# Patient Record
Sex: MALE | Race: WHITE | NOT HISPANIC OR LATINO | Employment: FULL TIME | URBAN - METROPOLITAN AREA
[De-identification: names, ages, dates, MRNs, and addresses within clinical notes are randomized per-mention and may not be internally consistent; named-entity substitution may affect disease eponyms.]

---

## 2017-02-28 ENCOUNTER — OFFICE VISIT (OUTPATIENT)
Dept: FAMILY MEDICINE CLINIC | Facility: CLINIC | Age: 22
End: 2017-02-28

## 2017-02-28 VITALS
RESPIRATION RATE: 18 BRPM | HEART RATE: 70 BPM | HEIGHT: 69 IN | WEIGHT: 151 LBS | DIASTOLIC BLOOD PRESSURE: 80 MMHG | TEMPERATURE: 97.8 F | BODY MASS INDEX: 22.36 KG/M2 | SYSTOLIC BLOOD PRESSURE: 118 MMHG

## 2017-02-28 DIAGNOSIS — F41.9 ANXIETY: Primary | ICD-10-CM

## 2017-02-28 PROCEDURE — 99213 OFFICE O/P EST LOW 20 MIN: CPT | Performed by: FAMILY MEDICINE

## 2017-02-28 NOTE — PROGRESS NOTES
Subjective   Frank Gutierrez is a 21 y.o. male.     History of Present Illness     He notes that his mood is not as good as it used to be  He is starting to avoid people again after being ok in crowds  More anxious feeling again as well    He has tried and failed wellbutrin, prozac, lamictal in the past.  zoloft was best medicine for this      Review of Systems   Constitutional: Negative.    Psychiatric/Behavioral: The patient is nervous/anxious.        Objective   Physical Exam   Constitutional: He appears well-developed and well-nourished. No distress.   Cardiovascular: Normal rate, regular rhythm and normal heart sounds.    Pulmonary/Chest: Effort normal and breath sounds normal.   Psychiatric: He has a normal mood and affect. His behavior is normal.   Nursing note and vitals reviewed.      Assessment/Plan   Frank was seen today for follow-up.    Diagnoses and all orders for this visit:    Anxiety  -     Vortioxetine HBr (TRINTELLIX) 20 MG tablet; Take 20 mg by mouth Daily.    will try trintellix 20 mg, pt to call with update in 1 month, he agrees (he will call sooner with any problems)

## 2017-04-03 ENCOUNTER — TELEPHONE (OUTPATIENT)
Dept: FAMILY MEDICINE CLINIC | Facility: CLINIC | Age: 22
End: 2017-04-03

## 2017-04-03 NOTE — TELEPHONE ENCOUNTER
----- Message from María Burris sent at 4/3/2017 12:38 PM EDT -----  Contact: IRVIN;PT CALLED  PT STATES HE HAS NOT GOTTEN THE TRINTELLIX RX BECAUSE  [facility] STATE IT WAS NOT SENT [facility] BACK IN [facility]  BUT ZOLOFT WAS THERE SO HE HAS BEEN TAKING IT INSTEAD BUT  HE IS RUNNING OUT OF IT  [facility]    [phone]  MESSAGE OK

## 2017-04-14 DIAGNOSIS — R45.4 IRRITABILITY: ICD-10-CM

## 2017-04-14 DIAGNOSIS — F32.A DEPRESSION: ICD-10-CM

## 2017-04-14 RX ORDER — SERTRALINE HYDROCHLORIDE 100 MG/1
TABLET, FILM COATED ORAL
Qty: 180 TABLET | Refills: 0 | Status: SHIPPED | OUTPATIENT
Start: 2017-04-14 | End: 2018-03-07

## 2017-08-09 ENCOUNTER — OFFICE VISIT (OUTPATIENT)
Dept: FAMILY MEDICINE CLINIC | Facility: CLINIC | Age: 22
End: 2017-08-09

## 2017-08-09 VITALS
DIASTOLIC BLOOD PRESSURE: 82 MMHG | RESPIRATION RATE: 18 BRPM | WEIGHT: 156 LBS | BODY MASS INDEX: 23.11 KG/M2 | HEIGHT: 69 IN | TEMPERATURE: 97.2 F | SYSTOLIC BLOOD PRESSURE: 118 MMHG | HEART RATE: 68 BPM

## 2017-08-09 DIAGNOSIS — F34.1 DYSTHYMIA: Primary | ICD-10-CM

## 2017-08-09 PROCEDURE — 99213 OFFICE O/P EST LOW 20 MIN: CPT | Performed by: FAMILY MEDICINE

## 2017-08-09 RX ORDER — VILAZODONE HYDROCHLORIDE 20 MG/1
TABLET ORAL
Qty: 60 TABLET | Refills: 1 | Status: SHIPPED | OUTPATIENT
Start: 2017-08-09 | End: 2017-08-09 | Stop reason: SDUPTHER

## 2017-08-09 RX ORDER — VILAZODONE HYDROCHLORIDE 20 MG/1
TABLET ORAL
Qty: 30 TABLET | Refills: 1 | Status: SHIPPED | OUTPATIENT
Start: 2017-08-09 | End: 2018-03-07

## 2017-08-09 NOTE — PROGRESS NOTES
Subjective   Frank Gutierrez is a 22 y.o. male.     History of Present Illness     He has had nausea with the trintellix and not helping his mood as much either  In the past failed wellbutrin, prozac, lamictal and zoloft    Mood just not that good  He has a history of alcohol and drug abuse but has been clean for 4 years      Review of Systems   Psychiatric/Behavioral: Positive for dysphoric mood.       Objective   Physical Exam   Constitutional: He appears well-developed and well-nourished. No distress.   Cardiovascular: Normal rate, regular rhythm and normal heart sounds.    Pulmonary/Chest: Effort normal and breath sounds normal.   Psychiatric: He has a normal mood and affect. His behavior is normal.   Nursing note and vitals reviewed.      Assessment/Plan   Frank was seen today for follow-up.    Diagnoses and all orders for this visit:    Dysthymia  -     Discontinue: vilazodone (VIIBRYD) 20 MG tablet tablet; 1/2 po daily 6 days then 1 po daily 6 days then 2 po daily  -     vilazodone (VIIBRYD) 20 MG tablet tablet; 1/2 po daily 6 days then 1 po daily    he has failed wellbutrin, prozac, lamictal and trintellix.  Will try viibryd and insurance will only pay for one a day so titrate to 20 mg.  Pt to call in one month with update.  Consider return to zoloft and then add abilify in the future pending results of viibryd.

## 2018-03-06 ENCOUNTER — TELEPHONE (OUTPATIENT)
Dept: FAMILY MEDICINE CLINIC | Facility: CLINIC | Age: 23
End: 2018-03-06

## 2018-03-06 NOTE — TELEPHONE ENCOUNTER
----- Message from María Winters sent at 3/6/2018  4:35 PM EST -----  Contact: IRVIN  PATIENT HASN'T BEEN HERE IN A WHILE, BUT HE DOESN'T WANT A REFILL OF THE MEDICATION THAT HE IS CURRENTLY ON BECAUSE IT IS NOT WORKING. HE WORKS 6 DAYS A WEEK AND IS IN THE [employer] SO IT IS HARD TO GET IN HERE FOR AN APPT.     HE IS ASKING IF HIS MEDICATION CAN BE CHANGED TO ANOTHER MEDICATION.    PHARMACY: [facility]    [phone]

## 2018-03-07 RX ORDER — SERTRALINE HYDROCHLORIDE 100 MG/1
100 TABLET, FILM COATED ORAL DAILY
Qty: 30 TABLET | Refills: 0 | Status: SHIPPED | OUTPATIENT
Start: 2018-03-07 | End: 2018-05-07

## 2018-03-07 NOTE — TELEPHONE ENCOUNTER
I sent in zoloft to resume but he will need an appointment for more than 30 day supply.  He simply needs to be seen to continue medications at this office.

## 2018-05-07 ENCOUNTER — OFFICE VISIT (OUTPATIENT)
Dept: FAMILY MEDICINE CLINIC | Facility: CLINIC | Age: 23
End: 2018-05-07

## 2018-05-07 VITALS
BODY MASS INDEX: 25.48 KG/M2 | HEART RATE: 78 BPM | HEIGHT: 69 IN | WEIGHT: 172 LBS | TEMPERATURE: 97.4 F | RESPIRATION RATE: 18 BRPM | DIASTOLIC BLOOD PRESSURE: 72 MMHG | SYSTOLIC BLOOD PRESSURE: 114 MMHG

## 2018-05-07 DIAGNOSIS — R76.8 ABNORMAL HEPATITIS SEROLOGY: Primary | ICD-10-CM

## 2018-05-07 PROCEDURE — 99213 OFFICE O/P EST LOW 20 MIN: CPT | Performed by: FAMILY MEDICINE

## 2018-05-07 RX ORDER — BUSPIRONE HYDROCHLORIDE 15 MG/1
TABLET ORAL
Refills: 0 | COMMUNITY
Start: 2018-04-18 | End: 2018-08-13 | Stop reason: SDUPTHER

## 2018-05-07 RX ORDER — VENLAFAXINE HYDROCHLORIDE 150 MG/1
150 CAPSULE, EXTENDED RELEASE ORAL DAILY
Refills: 0 | COMMUNITY
Start: 2018-04-18 | End: 2018-08-13 | Stop reason: SDUPTHER

## 2018-05-08 LAB
ALBUMIN SERPL-MCNC: 4.5 G/DL (ref 3.2–4.8)
ALP SERPL-CCNC: 67 U/L (ref 25–100)
ALT SERPL-CCNC: 32 U/L (ref 7–40)
AST SERPL-CCNC: 25 U/L (ref 0–33)
BILIRUB DIRECT SERPL-MCNC: 0.1 MG/DL (ref 0–0.2)
BILIRUB SERPL-MCNC: 0.4 MG/DL (ref 0.3–1.2)
HAV IGM SERPL QL IA: NEGATIVE
HBV CORE IGM SERPL QL IA: NEGATIVE
HBV SURFACE AG SERPL QL IA: NEGATIVE
HCV AB S/CO SERPL IA: <0.1 S/CO RATIO (ref 0–0.9)
PROT SERPL-MCNC: 6.6 G/DL (ref 5.7–8.2)

## 2018-08-12 NOTE — PROGRESS NOTES
Subjective   Frank Gutierrez is a 23 y.o. male.   Chief Complaint   Patient presents with   • Depression     followup   • Poison Ivy     Depression   Visit Type: follow-up  Patient is not experiencing: confusion, depressed mood, excessive worry, nervousness/anxiety, palpitations, shortness of breath and suicidal ideas.  Frequency of symptoms: occasionally   Severity: mild   Sleep quality: good  Compliance with medications:  %        Anxiety   Presents for follow-up visit. Patient reports no chest pain, confusion, depressed mood, excessive worry, nervous/anxious behavior, palpitations, shortness of breath or suicidal ideas. Symptoms occur occasionally. The quality of sleep is good.     His past medical history is significant for depression. Compliance with medications is %.   Depression and anxiety are currently treated with Effexor, Buspar, and Vistaril.  Current treatment is doing well. He is traveling to [place] soon, wanting to make sure that he has enough medication and refills.     Rash: New onset, exposed to poison ivy. Rash present on upper extremities and neck.  Has treated with topical OTC poison ivy wash, some relief.   Rash has been present x 3 days.          The following portions of the patient's history were reviewed and updated as appropriate: allergies, current medications, past family history, past medical history, past social history, past surgical history and problem list.    Review of Systems   Constitutional: Negative for chills, fatigue and fever.   HENT: Negative.    Eyes: Negative.    Respiratory: Negative for cough, chest tightness and shortness of breath.    Cardiovascular: Negative for chest pain and palpitations.   Gastrointestinal: Negative.    Skin: Positive for rash.   Neurological: Negative for light-headedness, headache and confusion.   Hematological: Negative for adenopathy. Does not bruise/bleed easily.   Psychiatric/Behavioral: Negative for dysphoric mood,  self-injury, suicidal ideas and depressed mood. The patient is not nervous/anxious.        Objective   Physical Exam   Constitutional: He is oriented to person, place, and time. He appears well-developed and well-nourished. No distress.   HENT:   Head: Normocephalic.   Right Ear: External ear normal.   Left Ear: External ear normal.   Nose: Nose normal.   Eyes: Conjunctivae are normal.   Neck: Normal range of motion.   Cardiovascular: Normal rate, regular rhythm and normal heart sounds.    No murmur heard.  Pulmonary/Chest: Effort normal and breath sounds normal. He has no wheezes.   Musculoskeletal: He exhibits no edema or deformity.   Neurological: He is alert and oriented to person, place, and time.   Skin: Skin is warm and dry. Rash noted. Rash is macular (present on neck, upper extremities, mild erythema). Rash is not papular and not vesicular.   Psychiatric: He has a normal mood and affect. His speech is normal and behavior is normal. Thought content normal.   Nursing note and vitals reviewed.        Assessment/Plan   Frank was seen today for depression and poison ivy.    Diagnoses and all orders for this visit:    Dysthymia  -     venlafaxine XR (EFFEXOR-XR) 150 MG 24 hr capsule; Take 1 capsule by mouth Daily.    Anxiety  -     hydrOXYzine (VISTARIL) 100 MG capsule; Take 1 capsule by mouth Daily.  -     venlafaxine XR (EFFEXOR-XR) 150 MG 24 hr capsule; Take 1 capsule by mouth Daily.  -     busPIRone (BUSPAR) 15 MG tablet; Take 1 tablet by mouth 2 (Two) Times a Day.    Poison ivy dermatitis  -     predniSONE (DELTASONE) 10 MG tablet; Take 60 mg po day 1, 50 mg day 2, 40 mg day 3, 30 mg day 4, 20 mg day 5, 10 mg day 6      Anxiety and depression are stable with current treatment, no changes.   Prednisone to improve poison ivy dermatitis.

## 2018-08-13 ENCOUNTER — OFFICE VISIT (OUTPATIENT)
Dept: FAMILY MEDICINE CLINIC | Facility: CLINIC | Age: 23
End: 2018-08-13

## 2018-08-13 VITALS
SYSTOLIC BLOOD PRESSURE: 112 MMHG | HEART RATE: 72 BPM | TEMPERATURE: 97.9 F | HEIGHT: 69 IN | RESPIRATION RATE: 18 BRPM | DIASTOLIC BLOOD PRESSURE: 76 MMHG | WEIGHT: 163.5 LBS | BODY MASS INDEX: 24.22 KG/M2

## 2018-08-13 DIAGNOSIS — L23.7 POISON IVY DERMATITIS: ICD-10-CM

## 2018-08-13 DIAGNOSIS — F34.1 DYSTHYMIA: Primary | ICD-10-CM

## 2018-08-13 DIAGNOSIS — F41.9 ANXIETY: ICD-10-CM

## 2018-08-13 PROCEDURE — 99214 OFFICE O/P EST MOD 30 MIN: CPT | Performed by: FAMILY MEDICINE

## 2018-08-13 RX ORDER — VENLAFAXINE HYDROCHLORIDE 150 MG/1
150 CAPSULE, EXTENDED RELEASE ORAL DAILY
Qty: 90 CAPSULE | Refills: 1 | Status: SHIPPED | OUTPATIENT
Start: 2018-08-13 | End: 2019-10-29 | Stop reason: SDUPTHER

## 2018-08-13 RX ORDER — PREDNISONE 10 MG/1
TABLET ORAL
Qty: 21 TABLET | Refills: 0 | Status: SHIPPED | OUTPATIENT
Start: 2018-08-13 | End: 2019-03-27

## 2018-08-13 RX ORDER — HYDROXYZINE PAMOATE 100 MG/1
CAPSULE ORAL
COMMUNITY
Start: 2018-06-18 | End: 2018-08-13 | Stop reason: SDUPTHER

## 2018-08-13 RX ORDER — BUSPIRONE HYDROCHLORIDE 15 MG/1
15 TABLET ORAL 2 TIMES DAILY
Qty: 180 TABLET | Refills: 1 | Status: SHIPPED | OUTPATIENT
Start: 2018-08-13 | End: 2019-10-29 | Stop reason: SDUPTHER

## 2018-08-13 RX ORDER — HYDROXYZINE PAMOATE 100 MG/1
100 CAPSULE ORAL DAILY
Qty: 90 CAPSULE | Refills: 1 | Status: SHIPPED | OUTPATIENT
Start: 2018-08-13 | End: 2019-03-27

## 2018-08-13 NOTE — PATIENT INSTRUCTIONS
Go to the nearest ER or return to clinic if symptoms worsen, fever/chill develop      Poison Ivy Dermatitis  Poison ivy dermatitis is redness and soreness (inflammation) of the skin. It is caused by a chemical that is found on the leaves of the poison ivy plant. You may also have itching, a rash, and blisters. Symptoms often clear up in 1-2 weeks.  You may get this condition by touching a poison ivy plant. You can also get it by touching something that has the chemical on it. This may include animals or objects that have come in contact with the plant.  Follow these instructions at home:  General instructions  · Take or apply over-the-counter and prescription medicines only as told by your doctor.  · If you touch poison ivy, wash your skin with soap and cold water right away.  · Use hydrocortisone creams or calamine lotion as needed to help with itching.  · Take oatmeal baths as needed. Use colloidal oatmeal. You can get this at a pharmacy or grocery store. Follow the instructions on the package.  · Do not scratch or rub your skin.  · While you have the rash, wash your clothes right after you wear them.  Prevention  · Know what poison ivy looks like so you can avoid it. This plant has three leaves with flowering branches on a single stem. The leaves are glossy. They have uneven edges that come to a point at the front.  · If you have touched poison ivy, wash with soap and water right away. Be sure to wash under your fingernails.  · When hiking or camping, wear long pants, a long-sleeved shirt, tall socks, and hiking boots. You can also use a lotion on your skin that helps to prevent contact with the chemical on the plant.  · If you think that your clothes or outdoor gear came in contact with poison ivy, rinse them off with a garden hose before you bring them inside your house.  Contact a doctor if:  · You have open sores in the rash area.  · You have more redness, swelling, or pain in the affected area.  · You have  redness that spreads beyond the rash area.  · You have fluid, blood, or pus coming from the affected area.  · You have a fever.  · You have a rash over a large area of your body.  · You have a rash on your eyes, mouth, or genitals.  · Your rash does not get better after a few days.  Get help right away if:  · Your face swells or your eyes swell shut.  · You have trouble breathing.  · You have trouble swallowing.  This information is not intended to replace advice given to you by your health care provider. Make sure you discuss any questions you have with your health care provider.  Document Released: 01/20/2012 Document Revised: 05/25/2017 Document Reviewed: 05/25/2016  [employer] Interactive Patient Education © 2018 [employer]

## 2019-03-27 ENCOUNTER — OFFICE VISIT (OUTPATIENT)
Dept: FAMILY MEDICINE CLINIC | Facility: CLINIC | Age: 24
End: 2019-03-27

## 2019-03-27 VITALS
WEIGHT: 170 LBS | RESPIRATION RATE: 16 BRPM | BODY MASS INDEX: 25.18 KG/M2 | HEART RATE: 74 BPM | DIASTOLIC BLOOD PRESSURE: 80 MMHG | TEMPERATURE: 98.4 F | SYSTOLIC BLOOD PRESSURE: 124 MMHG | HEIGHT: 69 IN

## 2019-03-27 DIAGNOSIS — F10.10 ALCOHOL ABUSE: Primary | ICD-10-CM

## 2019-03-27 PROCEDURE — 99213 OFFICE O/P EST LOW 20 MIN: CPT | Performed by: FAMILY MEDICINE

## 2019-03-27 NOTE — PROGRESS NOTES
Sadie Gutierrez is a 23 y.o. male.     History of Present Illness     He has a history of alcohol abuse and was in treratment from age [age]  Was sober for 4 years and then had relapse around age [age] for about a year  Then was in rehab again from march-April 2018 for 30 days  Sober for ab out 5 months and then started to drink again until about 2 weeks  He has been sober for 2 weeks and using AA at this time  He has been working the 12 steps    His father is using [employer] and they are working through this issue to determine future plans  He has a sponsor and is going to AA three times a week    Court date is 4/18/19    Pt does live alone in [place]  Working at [employer] for the last week and had been with [employer] prior to that    The following portions of the patient's history were reviewed and updated as appropriate: allergies, current medications, past family history, past medical history, past social history, past surgical history and problem list.    Review of Systems   Constitutional: Negative.    Cardiovascular: Negative.    Gastrointestinal: Negative.    Skin: Negative.    Psychiatric/Behavioral: Negative.  Negative for dysphoric mood. The patient is not nervous/anxious.        Objective   Physical Exam   Constitutional: He appears well-developed and well-nourished. No distress.   Cardiovascular: Normal rate, regular rhythm and normal heart sounds.   Pulmonary/Chest: Effort normal and breath sounds normal.   Psychiatric: He has a normal mood and affect. His behavior is normal.   Nursing note and vitals reviewed.      Assessment/Plan   Diagnoses and all orders for this visit:    Alcohol abuse    he is involved in AA which is an excellent program.  I did give him information about other outpatients programs that he could be involved with in order to keep his job and pay bills.  These included [employer], [employer] and [employer].  He may look into this but will continue with  [employer].  Pt is motivated to stop his drinking as he understands it is a problem and he needs help to stop.

## 2019-10-29 ENCOUNTER — OFFICE VISIT (OUTPATIENT)
Dept: FAMILY MEDICINE CLINIC | Facility: CLINIC | Age: 24
End: 2019-10-29

## 2019-10-29 VITALS
DIASTOLIC BLOOD PRESSURE: 82 MMHG | BODY MASS INDEX: 26.36 KG/M2 | RESPIRATION RATE: 18 BRPM | HEIGHT: 69 IN | TEMPERATURE: 98.2 F | SYSTOLIC BLOOD PRESSURE: 122 MMHG | HEART RATE: 78 BPM | WEIGHT: 178 LBS

## 2019-10-29 DIAGNOSIS — F10.10 ALCOHOL ABUSE: ICD-10-CM

## 2019-10-29 DIAGNOSIS — F41.9 ANXIETY: ICD-10-CM

## 2019-10-29 DIAGNOSIS — F34.1 DYSTHYMIA: Primary | ICD-10-CM

## 2019-10-29 DIAGNOSIS — B00.9 HSV-2 (HERPES SIMPLEX VIRUS 2) INFECTION: ICD-10-CM

## 2019-10-29 DIAGNOSIS — R45.4 IRRITABILITY: ICD-10-CM

## 2019-10-29 PROCEDURE — 99214 OFFICE O/P EST MOD 30 MIN: CPT | Performed by: FAMILY MEDICINE

## 2019-10-29 RX ORDER — VENLAFAXINE HYDROCHLORIDE 150 MG/1
150 CAPSULE, EXTENDED RELEASE ORAL DAILY
Qty: 90 CAPSULE | Refills: 1 | Status: SHIPPED | OUTPATIENT
Start: 2019-10-29 | End: 2020-05-28

## 2019-10-29 RX ORDER — DISULFIRAM 250 MG/1
250 TABLET ORAL DAILY
Qty: 90 TABLET | Refills: 1 | Status: SHIPPED | OUTPATIENT
Start: 2019-10-29 | End: 2020-06-01

## 2019-10-29 RX ORDER — VENLAFAXINE HYDROCHLORIDE 75 MG/1
75 CAPSULE, EXTENDED RELEASE ORAL DAILY
Qty: 7 CAPSULE | Refills: 0 | Status: SHIPPED | OUTPATIENT
Start: 2019-10-29 | End: 2020-06-01

## 2019-10-29 RX ORDER — BUSPIRONE HYDROCHLORIDE 15 MG/1
15 TABLET ORAL 2 TIMES DAILY
Qty: 180 TABLET | Refills: 1 | Status: SHIPPED | OUTPATIENT
Start: 2019-10-29 | End: 2020-06-01 | Stop reason: SDUPTHER

## 2019-10-29 NOTE — PROGRESS NOTES
Sadie Gutierrez is a 24 y.o. male.     History of Present Illness     He has a long history of alcohol abuse  Was sober for ab out 4 years and then started drinking again  He was on antabuse and this helps, AA helped in the past as well  He would like to get back on medicine for his mood and get help with the alcohol    Mood has been doing well on effexor 150 QD and buspar 15 BID in the past  He was off it 2-3 months but then started it 2 days ago  His mood was so much better while on the medicine    He has had a couple skin outbreaks in his groin and was seen at [facility] on 2 different occasions for this.  Was told this was a MRSA and HSV-1 outbreak one time in the past  Then he had another outbreak and was told this was HSV-2  These 2 outbreaks were over the last month.  He has not had this prior to the last month  Just wants to know what this is and if there is anything to prevent this    The following portions of the patient's history were reviewed and updated as appropriate: allergies, current medications, past family history, past medical history, past social history, past surgical history and problem list.    Review of Systems   Constitutional: Negative.    HENT: Negative.    Eyes: Negative.    Respiratory: Negative.  Negative for shortness of breath.    Cardiovascular: Negative.  Negative for chest pain.   Gastrointestinal: Negative.  Negative for constipation and diarrhea.   Musculoskeletal: Negative.    Skin: Positive for rash (groin).   Neurological: Negative.    Psychiatric/Behavioral: Positive for dysphoric mood. The patient is nervous/anxious.    All other systems reviewed and are negative.      Objective   Physical Exam   Constitutional: He is oriented to person, place, and time. He appears well-developed and well-nourished. No distress.   Cardiovascular: Normal rate, regular rhythm and normal heart sounds.   Pulmonary/Chest: Effort normal and breath sounds normal.   Abdominal: Soft. Bowel  sounds are normal. He exhibits no distension. There is no tenderness.   Neurological: He is alert and oriented to person, place, and time.   Psychiatric: He has a normal mood and affect. His behavior is normal. Judgment and thought content normal.   Nursing note and vitals reviewed.      Assessment/Plan   Frank was seen today for med refill.    Diagnoses and all orders for this visit:    Dysthymia  -     venlafaxine XR (EFFEXOR XR) 75 MG 24 hr capsule; Take 1 capsule by mouth Daily.  -     venlafaxine XR (EFFEXOR-XR) 150 MG 24 hr capsule; Take 1 capsule by mouth Daily.    Irritability  -     venlafaxine XR (EFFEXOR XR) 75 MG 24 hr capsule; Take 1 capsule by mouth Daily.    Anxiety  -     venlafaxine XR (EFFEXOR-XR) 150 MG 24 hr capsule; Take 1 capsule by mouth Daily.  -     busPIRone (BUSPAR) 15 MG tablet; Take 1 tablet by mouth 2 (Two) Times a Day.    Alcohol abuse  -     disulfiram (ANTABUSE) 250 MG tablet; Take 1 tablet by mouth Daily.    HSV-2 (herpes simplex virus 2) infection  -     HSV 1 & 2 - Specific Antibody, IgG    mood was doing well on effexor and buspar, will resume medications.  Ok to resume antabuse for alcohol, AA meetings recommended and pt will start going back to the program  Will check labs for HSV, consider daily valtrex regardless of the results to prevent reoccurence.  Pt wants to wait for labs.

## 2019-10-30 DIAGNOSIS — B00.9 HSV-2 (HERPES SIMPLEX VIRUS 2) INFECTION: Primary | ICD-10-CM

## 2019-10-30 LAB
HSV1 IGG SER IA-ACNC: <0.91 INDEX (ref 0–0.9)
HSV2 IGG SER IA-ACNC: 15.4 INDEX (ref 0–0.9)

## 2019-10-30 RX ORDER — VALACYCLOVIR HYDROCHLORIDE 500 MG/1
500 TABLET, FILM COATED ORAL DAILY
Qty: 30 TABLET | Refills: 11 | Status: SHIPPED | OUTPATIENT
Start: 2019-10-30

## 2019-12-17 ENCOUNTER — OFFICE VISIT (OUTPATIENT)
Dept: FAMILY MEDICINE CLINIC | Facility: CLINIC | Age: 24
End: 2019-12-17

## 2019-12-17 VITALS
RESPIRATION RATE: 16 BRPM | HEART RATE: 76 BPM | HEIGHT: 69 IN | WEIGHT: 175 LBS | SYSTOLIC BLOOD PRESSURE: 124 MMHG | TEMPERATURE: 98.1 F | DIASTOLIC BLOOD PRESSURE: 84 MMHG | BODY MASS INDEX: 25.92 KG/M2

## 2019-12-17 DIAGNOSIS — F34.1 DYSTHYMIA: Primary | ICD-10-CM

## 2019-12-17 DIAGNOSIS — R45.4 IRRITABILITY: ICD-10-CM

## 2019-12-17 PROCEDURE — 99213 OFFICE O/P EST LOW 20 MIN: CPT | Performed by: FAMILY MEDICINE

## 2019-12-17 NOTE — PROGRESS NOTES
Sadie Gutierrez is a 24 y.o. male.     History of Present Illness     His mood is doing well  He needs me to complete form that states he can be on meds and safely drive.  There are no concerns on my apart about him driving while on effexor ad buspar          Review of Systems   Constitutional: Negative.        Objective   Physical Exam   Constitutional: He is oriented to person, place, and time. He appears well-developed and well-nourished. No distress.   Cardiovascular: Normal rate, regular rhythm and normal heart sounds.   Pulmonary/Chest: Effort normal and breath sounds normal.   Neurological: He is alert and oriented to person, place, and time.   Psychiatric: He has a normal mood and affect. His behavior is normal. Judgment and thought content normal.   Nursing note and vitals reviewed.      Assessment/Plan   Diagnoses and all orders for this visit:    Dysthymia    Irritability      Mood is doing well and I cleared him for driving while on effexor and buspar.  Form completed for this    He is still taking the antabuse, no recent alcohol usage noted

## 2020-04-27 ENCOUNTER — TELEPHONE (OUTPATIENT)
Dept: FAMILY MEDICINE CLINIC | Facility: CLINIC | Age: 25
End: 2020-04-27

## 2020-04-27 DIAGNOSIS — F10.10 ALCOHOL ABUSE: Primary | ICD-10-CM

## 2020-04-27 RX ORDER — DISULFIRAM 500 MG/1
250 TABLET ORAL DAILY
Qty: 15 EACH | Refills: 2 | Status: SHIPPED | OUTPATIENT
Start: 2020-04-27 | End: 2020-06-01 | Stop reason: SDUPTHER

## 2020-04-27 NOTE — TELEPHONE ENCOUNTER
Pharmacy stated that disulfiram (ANTABUSE) 250 MG tablet is on back order and would like permission to fill prescription for disulfiram (ANTABUSE) 500 MG and for patient cut the tablet in half and take 1/2 tablet per day.    Please call and advise.   [facility] [address] [place] [address], [place] - [address] [facility] [phone] PH

## 2020-05-27 DIAGNOSIS — F10.10 ALCOHOL ABUSE: ICD-10-CM

## 2020-05-28 DIAGNOSIS — F41.9 ANXIETY: ICD-10-CM

## 2020-05-28 DIAGNOSIS — F34.1 DYSTHYMIA: ICD-10-CM

## 2020-05-28 RX ORDER — VENLAFAXINE HYDROCHLORIDE 150 MG/1
CAPSULE, EXTENDED RELEASE ORAL
Qty: 30 CAPSULE | Refills: 0 | Status: SHIPPED | OUTPATIENT
Start: 2020-05-28 | End: 2020-06-01

## 2020-06-01 ENCOUNTER — OFFICE VISIT (OUTPATIENT)
Dept: FAMILY MEDICINE CLINIC | Facility: CLINIC | Age: 25
End: 2020-06-01

## 2020-06-01 VITALS
BODY MASS INDEX: 26.07 KG/M2 | HEART RATE: 74 BPM | RESPIRATION RATE: 18 BRPM | TEMPERATURE: 97.3 F | HEIGHT: 69 IN | WEIGHT: 176 LBS | SYSTOLIC BLOOD PRESSURE: 122 MMHG | DIASTOLIC BLOOD PRESSURE: 82 MMHG

## 2020-06-01 DIAGNOSIS — F10.10 ALCOHOL ABUSE: ICD-10-CM

## 2020-06-01 DIAGNOSIS — F41.9 ANXIETY: ICD-10-CM

## 2020-06-01 DIAGNOSIS — F34.1 DYSTHYMIA: ICD-10-CM

## 2020-06-01 PROCEDURE — 99213 OFFICE O/P EST LOW 20 MIN: CPT | Performed by: FAMILY MEDICINE

## 2020-06-01 RX ORDER — DISULFIRAM 250 MG/1
TABLET ORAL
Qty: 30 TABLET | Refills: 5 | Status: SHIPPED | OUTPATIENT
Start: 2020-06-01

## 2020-06-01 RX ORDER — BUSPIRONE HYDROCHLORIDE 15 MG/1
15 TABLET ORAL 2 TIMES DAILY
Qty: 180 TABLET | Refills: 1 | Status: SHIPPED | OUTPATIENT
Start: 2020-06-01

## 2020-06-01 RX ORDER — VENLAFAXINE HYDROCHLORIDE 150 MG/1
150 CAPSULE, EXTENDED RELEASE ORAL DAILY
Qty: 90 CAPSULE | Refills: 1 | Status: SHIPPED | OUTPATIENT
Start: 2020-06-01 | End: 2021-01-19 | Stop reason: SDUPTHER

## 2020-06-01 RX ORDER — DISULFIRAM 500 MG/1
250 TABLET ORAL DAILY
Qty: 45 EACH | Refills: 1 | Status: SHIPPED | OUTPATIENT
Start: 2020-06-01

## 2020-06-01 NOTE — PROGRESS NOTES
Subjective   Frank Gutierrez is a 24 y.o. male.     History of Present Illness     Mood has been doing well with the effexor and the buspar  This really helps him and he needs to be on it  No SE    Still on antabuse as well,  No issues with this either but pharmacy had a hard time getting this medicine in stock for him  No use of alcohol      Review of Systems   Constitutional: Negative.    Psychiatric/Behavioral: Negative.        Objective   Physical Exam   Constitutional: He appears well-developed and well-nourished. No distress.   Cardiovascular: Normal rate, regular rhythm and normal heart sounds.   Pulmonary/Chest: Effort normal and breath sounds normal.   Psychiatric: He has a normal mood and affect. His behavior is normal.   Nursing note and vitals reviewed.      Assessment/Plan   Diagnoses and all orders for this visit:    Dysthymia  -     venlafaxine XR (EFFEXOR-XR) 150 MG 24 hr capsule; Take 1 capsule by mouth Daily.    Anxiety  -     venlafaxine XR (EFFEXOR-XR) 150 MG 24 hr capsule; Take 1 capsule by mouth Daily.  -     busPIRone (BUSPAR) 15 MG tablet; Take 1 tablet by mouth 2 (Two) Times a Day.    Alcohol abuse  -     disulfiram (Antabuse) 500 MG tablet tablet; Take 0.5 tablets by mouth Daily.    mood doing well on buspar and effexor, refilled meds  Continue antabuse as well, he will call with any pharmacy issues

## 2021-01-18 ENCOUNTER — TELEPHONE (OUTPATIENT)
Dept: FAMILY MEDICINE CLINIC | Facility: CLINIC | Age: 26
End: 2021-01-18

## 2021-01-18 DIAGNOSIS — F41.9 ANXIETY: ICD-10-CM

## 2021-01-18 DIAGNOSIS — F34.1 DYSTHYMIA: ICD-10-CM

## 2021-01-18 RX ORDER — VENLAFAXINE HYDROCHLORIDE 150 MG/1
CAPSULE, EXTENDED RELEASE ORAL
Qty: 90 CAPSULE | Refills: 0 | OUTPATIENT
Start: 2021-01-18

## 2021-01-18 NOTE — TELEPHONE ENCOUNTER
Caller: [email or web address]    Relationship: Self    Best call back number: [phone]    Medication needed:   Requested Prescriptions     Pending Prescriptions Disp Refills   • venlafaxine XR (EFFEXOR-XR) 150 MG 24 hr capsule [Pharmacy Med Name: Venlafaxine HCl  MG Oral Capsule Extended Release 24 Hour] 90 capsule 0     Sig: Take 1 capsule by mouth once daily       When do you need the refill by: 01/18/21    Does the patient have less than a 3 day supply:  [x] Yes  [] No    What is the patient's preferred pharmacy: [facility] [address] [place] [address] [phone] [facility] [phone] FX

## 2021-01-19 RX ORDER — VENLAFAXINE HYDROCHLORIDE 150 MG/1
150 CAPSULE, EXTENDED RELEASE ORAL DAILY
Qty: 14 CAPSULE | Refills: 0 | Status: SHIPPED | OUTPATIENT
Start: 2021-01-19

## 2021-01-19 NOTE — TELEPHONE ENCOUNTER
I did send in 2 week supply.  He may need to find another provider if he cannot come in for an appointment.  Margie did discuss multiple options with him

## 2021-01-19 NOTE — TELEPHONE ENCOUNTER
Spoke with pt offered a short supply until he could be seen, offered late appt with PA, early appt with APR, he said it could be made but he would not make any appt unless it was the weekend.